# Patient Record
Sex: MALE | Race: WHITE | URBAN - NONMETROPOLITAN AREA
[De-identification: names, ages, dates, MRNs, and addresses within clinical notes are randomized per-mention and may not be internally consistent; named-entity substitution may affect disease eponyms.]

---

## 2022-03-23 ENCOUNTER — OFFICE VISIT (OUTPATIENT)
Age: 72
End: 2022-03-23

## 2022-03-23 VITALS
DIASTOLIC BLOOD PRESSURE: 84 MMHG | HEIGHT: 69 IN | RESPIRATION RATE: 18 BRPM | HEART RATE: 87 BPM | SYSTOLIC BLOOD PRESSURE: 124 MMHG | WEIGHT: 180.6 LBS | OXYGEN SATURATION: 98 % | TEMPERATURE: 97.3 F | BODY MASS INDEX: 26.75 KG/M2

## 2022-03-23 DIAGNOSIS — J02.9 SORE THROAT: ICD-10-CM

## 2022-03-23 DIAGNOSIS — J01.00 ACUTE NON-RECURRENT MAXILLARY SINUSITIS: Primary | ICD-10-CM

## 2022-03-23 LAB — S PYO AG THROAT QL: NORMAL

## 2022-03-23 PROCEDURE — 87880 STREP A ASSAY W/OPTIC: CPT | Performed by: NURSE PRACTITIONER

## 2022-03-23 PROCEDURE — 99203 OFFICE O/P NEW LOW 30 MIN: CPT | Performed by: NURSE PRACTITIONER

## 2022-03-23 RX ORDER — AMOXICILLIN AND CLAVULANATE POTASSIUM 875; 125 MG/1; MG/1
1 TABLET, FILM COATED ORAL 2 TIMES DAILY
Qty: 20 TABLET | Refills: 0 | Status: SHIPPED | OUTPATIENT
Start: 2022-03-23 | End: 2022-04-02

## 2022-03-23 RX ORDER — METHYLPREDNISOLONE 4 MG/1
TABLET ORAL
Qty: 1 KIT | Refills: 0 | Status: SHIPPED | OUTPATIENT
Start: 2022-03-23

## 2022-03-23 ASSESSMENT — ENCOUNTER SYMPTOMS
COLOR CHANGE: 0
BLOOD IN STOOL: 0
SORE THROAT: 1
EYE ITCHING: 0
CONSTIPATION: 0
WHEEZING: 0
COUGH: 0
SINUS PRESSURE: 0
NAUSEA: 0
RHINORRHEA: 0
SHORTNESS OF BREATH: 0
DIARRHEA: 0
EYE DISCHARGE: 0
VOMITING: 0
ABDOMINAL PAIN: 0

## 2022-03-23 NOTE — PROGRESS NOTES
[address] [address] [place] [address]  Dept: [phone]  Dept Fax: [phone]  Loc: [phone]    Hayden Braswell is a 66 y.o. male who presents today for his medical conditions/complaints as noted below. Hayden Braswell is c/o of Congestion and Pharyngitis    Communication throughout this visit between clinical staff and the patient was exchanged through a [place] speaking . HIPAA guidelines were followed and patient is understanding of care plan. HPI:     Cough  This is a new problem. The current episode started in the past 7 days. The problem has been waxing and waning. The problem occurs every few minutes. The cough is non-productive. Associated symptoms include nasal congestion and a sore throat. Pertinent negatives include no chest pain, chills, ear pain, fever, headaches, myalgias, rash, rhinorrhea, shortness of breath or wheezing. The symptoms are aggravated by exercise and lying down. He has tried OTC cough suppressant for the symptoms. The treatment provided mild relief. History reviewed. No pertinent past medical history. History reviewed. No pertinent surgical history. History reviewed. No pertinent family history. Social History     Tobacco Use    Smoking status: Never Smoker    Smokeless tobacco: Never Used   Substance Use Topics    Alcohol use: Yes      Current Outpatient Medications   Medication Sig Dispense Refill    methylPREDNISolone (MEDROL DOSEPACK) 4 MG tablet Take by mouth. 1 kit 0    amoxicillin-clavulanate (AUGMENTIN) 875-125 MG per tablet Take 1 tablet by mouth 2 times daily for 10 days 20 tablet 0     No current facility-administered medications for this visit.      No Known Allergies    Health Maintenance   Topic Date Due    Hepatitis C screen  Never done    COVID-19 Vaccine (1) Never done    Depression Screen  Never done    DTaP/Tdap/Td vaccine (1 - Tdap) Never done   [facility] Lipid screen  Never done    Colorectal Cancer Screen  Never done    Shingles Vaccine (1 of 2) Never done    Pneumococcal 65+ years Vaccine (1 of 1 - PPSV23) Never done    Flu vaccine (1) Never done    Hepatitis A vaccine  Aged Out    Hepatitis B vaccine  Aged Out    Hib vaccine  Aged Out    Meningococcal (ACWY) vaccine  Aged Out       Subjective:     Review of Systems   Constitutional: Negative for activity change, appetite change, chills, fatigue and fever. HENT: Positive for congestion and sore throat. Negative for ear pain, rhinorrhea and sinus pressure. Eyes: Negative for discharge and itching. Respiratory: Negative for cough, shortness of breath and wheezing. Cardiovascular: Negative for chest pain. Gastrointestinal: Negative for abdominal pain, blood in stool, constipation, diarrhea, nausea and vomiting. Musculoskeletal: Negative for myalgias. Skin: Negative for color change and rash. Neurological: Negative for dizziness and headaches. All other systems reviewed and are negative.      :Objective      Physical Exam  Vitals and nursing note reviewed. Constitutional:       General: He is not in acute distress. Appearance: Normal appearance. HENT:      Head: Normocephalic and atraumatic. Right Ear: Ear canal normal. A middle ear effusion is present. Left Ear: Ear canal normal. A middle ear effusion is present. Mouth/Throat:      Mouth: Mucous membranes are moist.      Pharynx: No posterior oropharyngeal erythema. Comments: Post nasal drip noted  Eyes:      Extraocular Movements: Extraocular movements intact. Pupils: Pupils are equal, round, and reactive to light. Cardiovascular:      Rate and Rhythm: Normal rate and regular rhythm. Pulses: Normal pulses. Heart sounds: Normal heart sounds. No murmur heard. Pulmonary:      Effort: Pulmonary effort is normal. No respiratory distress. Breath sounds: Normal breath sounds. No wheezing. Skin:     General: Skin is warm. Capillary Refill: Capillary refill takes less than 2 seconds. Coloration: Skin is not pale. Findings: No rash. Neurological:      General: No focal deficit present. Mental Status: He is alert and oriented to person, place, and time. Psychiatric:         Attention and Perception: Attention normal.         Mood and Affect: Mood normal.         Behavior: Behavior normal. Behavior is cooperative. Thought Content: Thought content normal.       /84   Pulse 87   Temp 97.3 °F (36.3 °C)   Resp 18   Ht 5' 8.5\" (1.74 m)   Wt 180 lb 9.6 oz (81.9 kg)   SpO2 98%   BMI 27.06 kg/m²     :Assessment       Diagnosis Orders   1. Acute non-recurrent maxillary sinusitis     2. Sore throat  POCT rapid strep A       :Plan   - Take full course of antibiotics  - Take medrol as directed  - Monitor for fever and treat as needed  - Recommended daily mucinex   - May use OTC claritin/zyrtec and nasal spray such as flonase to help symptoms  - If patient is not improving or developing any new/worsening symptoms then return to clinic or f/u with PCP     Orders Placed This Encounter   Procedures    POCT rapid strep A     Results for orders placed or performed in visit on 03/23/22   POCT rapid strep A   Result Value Ref Range    Strep A Ag None Detected None Detected       Return if symptoms worsen or fail to improve. Orders Placed This Encounter   Medications    methylPREDNISolone (MEDROL DOSEPACK) 4 MG tablet     Sig: Take by mouth. Dispense:  1 kit     Refill:  0    amoxicillin-clavulanate (AUGMENTIN) 875-125 MG per tablet     Sig: Take 1 tablet by mouth 2 times daily for 10 days     Dispense:  20 tablet     Refill:  0         Patient given educational materials - see patient instructions. Discussed use, benefit, and side effects of prescribed medications. All patient questions answered. Pt voiced understanding.      Patient Instructions     Patient Education        Sinusite : recommandations de soin  Sinusitis: Care Instructions  Greta recommandations de soin     La sinusite est une infection de la muqueuse aquilino cavités nasales. Cee survient souvent avec un rhume et provoque aquilino [employer] et aquilino [employer] la tête et willis le visage. Dans la plupart aquilino gabbie, la sinusite s'améliore d'cee-même en 1 ou 2 semaines. Mais certains symptômes légers peuvent durer [employer]. Parfois, il est nécessaire de prendre [employer]. Les soins de suivi sont essentiels pour votre dereck-être et [employer]. Veuillez vous rendre à tous les rendez-vous et appelez votre médecin si vous [employer]. Il est également judicieux de connaître tous greta résultats d'examens et de [employer] aquilino médicaments que vous prenez. Comment prendre soin de vous à la penny ? · Prenez un médicament antidouleur sans ordonnance, tel que de l'acétaminophène (Tylenol), de l'ibuprofène (Advil, Motrin), ou du naproxène (Aleve). Lisez et conformez-vous aux instructions de la notice. · Si le médecin vous prescrit aquilino antibiotiques, prenez-les tel qu'indiqué. Ne cessez pas de les prendre kim parce que vous vous sentez mieux. Vous devez prendre votre traitement d'antibiotiques jusqu'au bout. · Soyez vigilant(e) lorsque vous prenez en même temps un médicament sans ordonnance contre le rhume ou la grippe et du Tylenol. Nombre de [facility] contiennent de l'acétaminophène, jada est du Tylenol. Lisez les notices pour vous assurer que vous ne prenez pas une dose [employer]. Un excédent d'acétaminophène (Tylenol) peut être dangereux. · Inhalez de l'air chaud et humide dans une douche à vapeur, un fran chaud ou un lavabo rempli d'eau chaude. Évitez l'air froid, sec. L'utilisation d'un humidificateur à la penny peut aider. Conformez-vous aux instructions pour [employer]   · Utilisez une solution nasale d'eau saline (eau de geetha) pour que greta voies nasales restent dégagées et [employer] mucosités et les bactéries. Vous pouvez acheter une solution nasale saline à l'épicerie ou en parapharmacie ou vous pouvez la fabriquer vous-même à la penny en mélangeant 1 cuillère à café de matthew et 1 cuillère à café de bicarbonate de soude à 2 tasses d'eau distillée. Si vous la faites vous-même, remplissez une poire de solution, insérez [employer] votre kostas et pressez doucement la poire. Puis mouchez-vous. · Placez une serviette chaude et humide ou une kimi de gel chaude willis votre visage, 3 à 4 fois par jour, pendant 5 à 10 minutes. · Melany Bicker un décongestionnant en pulvérisateur nasal comme l'oxymétazoline (Afrin). Ne l'utilisez pas plus de 3 jours d'affilée tash pourrait aggraver la congestion. Quand devez-vous demander de l'aide ? Appelez votre médecin immédiatement ou obtenez rapidement aquilino [facility] si :  · Vous avez le visage jada gonfle, tash s'accentue ou vous avez aquilino rougeurs autour aquilino yeux. · Vous avez de la fièvre ou cee augmente. Observez attentivement tout changement de votre état de santé et [employer] à contacter votre médecin si :  · Vous ressentez une nouvelle douleur au visage ou cee s'accentue. · Jose Guadalupe mucosités nasales s'épaississent (comme du pus) ou contiennent du sang. · Votre état ne s'améliore pas. Où obtenir plus dinformations ?  Aller   [email or web address]  [employer] C571 dans la zone prévue à cet effet pour en savoir plus \"Sinusite : recommandations de soin. \"  Camden Fontenot le: 8 septembre 2021               Version du contenu: 13.1  © 9920-3474 [employer], [employer]. Les instructions ont été adaptées sous licence par [employer] de santé. Pour toute question willis un état de santé ou willis michele instructions, demandez toujours lterry de votre professionnel de santé. Healthwise, [employer] décline toute garantie ou responsabilité quant à votre utilisation de michele informations.        - Take full course of antibiotics  - Take medrol as directed  - Monitor for fever and treat as needed  - Recommended daily mucinex   - May use OTC claritin/zyrtec and nasal spray such as flonase to help symptoms  - If patient is not improving or developing any new/worsening symptoms then return to clinic or f/u with PCP          Electronically signed by NENA Velez CNP on 3/23/2022 at 10:29 AM

## 2022-03-23 NOTE — PATIENT INSTRUCTIONS
Patient Education        Sinusite : recommandations de soin  Sinusitis: Care Instructions  Greta recommandations de soin     La sinusite est une infection de la muqueuse aquilino cavités nasales. Cee survient souvent avec un rhume et provoque aquilino [employer] et aquilino [employer] la tête et willis le visage. Dans la plupart aquilino gabbie, la sinusite s'améliore d'cee-même en 1 ou 2 semaines. Mais certains symptômes légers peuvent durer [employer]. Parfois, il est nécessaire de prendre [employer]. Les soins de suivi sont essentiels pour votre dereck-être et [employer]. Veuillez vous rendre à tous les rendez-vous et appelez votre médecin si vous [employer]. Il est également judicieux de connaître tous greta résultats d'examens et de [employer] aquilino médicaments que vous prenez. Comment prendre soin de vous à la penny ? · Prenez un médicament antidouleur sans ordonnance, tel que de l'acétaminophène (Tylenol), de l'ibuprofène (Advil, Motrin), ou du naproxène (Aleve). Lisez et conformez-vous aux instructions de la notice. · Si le médecin vous prescrit aquilino antibiotiques, prenez-les tel qu'indiqué. Ne cessez pas de les prendre kim parce que vous vous sentez mieux. Vous devez prendre votre traitement d'antibiotiques jusqu'au bout. · Soyez vigilant(e) lorsque vous prenez en même temps un médicament sans ordonnance contre le rhume ou la grippe et du Tylenol. Nombre de [facility] contiennent de l'acétaminophène, jada est du Tylenol. Lisez les notices pour vous assurer que vous ne prenez pas une dose [employer]. Un excédent d'acétaminophène (Tylenol) peut être dangereux. · Inhalez de l'air chaud et humide dans une douche à vapeur, un fran chaud ou un lavabo rempli d'eau chaude. Évitez l'air froid, sec. L'utilisation d'un humidificateur à la penny peut aider. Conformez-vous aux instructions pour [employer]   · Utilisez une solution nasale d'eau saline (eau de geetha) pour que greta voies course of antibiotics  - Take medrol as directed  - Monitor for fever and treat as needed  - Recommended daily mucinex   - May use OTC claritin/zyrtec and nasal spray such as flonase to help symptoms  - If patient is not improving or developing any new/worsening symptoms then return to clinic or f/u with PCP